# Patient Record
Sex: FEMALE | Race: WHITE | NOT HISPANIC OR LATINO | URBAN - METROPOLITAN AREA
[De-identification: names, ages, dates, MRNs, and addresses within clinical notes are randomized per-mention and may not be internally consistent; named-entity substitution may affect disease eponyms.]

---

## 2022-01-06 ENCOUNTER — EMERGENCY (EMERGENCY)
Facility: HOSPITAL | Age: 22
LOS: 1 days | Discharge: HOME | End: 2022-01-06
Admitting: EMERGENCY MEDICINE
Payer: COMMERCIAL

## 2022-01-06 VITALS
HEIGHT: 62 IN | TEMPERATURE: 98 F | OXYGEN SATURATION: 98 % | SYSTOLIC BLOOD PRESSURE: 110 MMHG | RESPIRATION RATE: 16 BRPM | WEIGHT: 177.91 LBS | DIASTOLIC BLOOD PRESSURE: 66 MMHG | HEART RATE: 75 BPM

## 2022-01-06 DIAGNOSIS — H53.8 OTHER VISUAL DISTURBANCES: ICD-10-CM

## 2022-01-06 DIAGNOSIS — H54.40 BLINDNESS, ONE EYE, UNSPECIFIED EYE: ICD-10-CM

## 2022-01-06 PROCEDURE — 99282 EMERGENCY DEPT VISIT SF MDM: CPT

## 2022-01-06 NOTE — ED PROVIDER NOTE - PATIENT PORTAL LINK FT
You can access the FollowMyHealth Patient Portal offered by [facility] by registering at the following website: [email or web address]. By joining [facility]’s FollowMyHealth portal, you will also be able to view your health information using other applications (apps) compatible with our system.

## 2022-01-06 NOTE — ED ADULT TRIAGE NOTE - CHIEF COMPLAINT QUOTE
Pt. walked in because needs "scans because of optic nerve swelling detected 12/27/21. Reports visual changes x 2 weeks.

## 2022-01-06 NOTE — ED PROVIDER NOTE - PROGRESS NOTE DETAILS
reviewed note from opthalmologist. recommending urgent follow up with PCP for further work up for papillary edema. with labs CT angio/MRI.

## 2022-01-06 NOTE — ED PROVIDER NOTE - CARE PROVIDER_API CALL
Fortino Mathew  OPHTHALMOLOGY  [address] [place] [id number]  Phone: [phone]  Fax: [phone]  Follow Up Time:     Leonidas Celaya ()  [address], [place] [address]  Phone: [phone]  Fax: [phone]  Follow Up Time:

## 2022-01-06 NOTE — ED PROVIDER NOTE - CLINICAL SUMMARY MEDICAL DECISION MAKING FREE TEXT BOX
Patient presents requesting test and follow up with neurophthalmologist after she was told by an opthalmologist on 12/27/2021 that she was noted to have papillary edema. patient given numbers for ophthalmologist and PCP. chart flagged for urgent referral managment

## 2022-01-06 NOTE — ED PROVIDER NOTE - CARE PROVIDERS DIRECT ADDRESSES
,DirectAddress_Unknown,[email or web address]

## 2022-01-06 NOTE — ED PROVIDER NOTE - OBJECTIVE STATEMENT
Patient presents to ED requesting follow up for neuroopthalmology. patient has been experiencing episodic blurry vision x 2 weeks. states that at times she notices that when she looks at a distance images appear much closer than they are. denies any vision changes at this time. she was traveling in [place] at the time and went to an ophthalmologist who told her that she had an optic nerve problem and would need to see a specialist and have further testing done. denies any pain, h/a, dizziness, LOC. states symptoms are episodic. she attempted to find make appointments when she returned home but cannot be seen until February.

## 2022-01-08 PROBLEM — H53.8 OTHER VISUAL DISTURBANCES: Chronic | Status: ACTIVE | Noted: 2022-01-06

## 2022-01-21 PROBLEM — Z00.00 ENCOUNTER FOR PREVENTIVE HEALTH EXAMINATION: Status: ACTIVE | Noted: 2022-01-21

## 2022-01-21 PROBLEM — Z13.220 LIPID SCREENING: Status: ACTIVE | Noted: 2022-01-21

## 2022-01-21 PROBLEM — Z13.1 DIABETES MELLITUS SCREENING: Status: ACTIVE | Noted: 2022-01-21

## 2022-01-21 PROBLEM — Z23 ENCOUNTER FOR IMMUNIZATION: Status: ACTIVE | Noted: 2022-01-21

## 2022-01-21 PROBLEM — Z11.3 SCREENING FOR STD (SEXUALLY TRANSMITTED DISEASE): Status: ACTIVE | Noted: 2022-01-21

## 2022-01-24 ENCOUNTER — APPOINTMENT (OUTPATIENT)
Dept: INTERNAL MEDICINE | Facility: CLINIC | Age: 22
End: 2022-01-24

## 2022-01-24 DIAGNOSIS — Z11.3 ENCOUNTER FOR SCREENING FOR INFECTIONS WITH A PREDOMINANTLY SEXUAL MODE OF TRANSMISSION: ICD-10-CM

## 2022-01-24 DIAGNOSIS — Z23 ENCOUNTER FOR IMMUNIZATION: ICD-10-CM

## 2022-01-24 DIAGNOSIS — Z13.220 ENCOUNTER FOR SCREENING FOR LIPOID DISORDERS: ICD-10-CM

## 2022-01-24 DIAGNOSIS — Z13.1 ENCOUNTER FOR SCREENING FOR DIABETES MELLITUS: ICD-10-CM
